# Patient Record
Sex: FEMALE | Race: WHITE | NOT HISPANIC OR LATINO | Employment: OTHER | URBAN - METROPOLITAN AREA
[De-identification: names, ages, dates, MRNs, and addresses within clinical notes are randomized per-mention and may not be internally consistent; named-entity substitution may affect disease eponyms.]

---

## 2023-02-28 LAB
ALANINE AMINOTRANSFERASE (SGPT) (U/L) IN SER/PLAS: 16 U/L (ref 7–45)
ALBUMIN (G/DL) IN SER/PLAS: 4.7 G/DL (ref 3.4–5)
ALKALINE PHOSPHATASE (U/L) IN SER/PLAS: 76 U/L (ref 33–136)
ANION GAP IN SER/PLAS: 13 MMOL/L (ref 10–20)
ASPARTATE AMINOTRANSFERASE (SGOT) (U/L) IN SER/PLAS: 16 U/L (ref 9–39)
BILIRUBIN TOTAL (MG/DL) IN SER/PLAS: 1.1 MG/DL (ref 0–1.2)
CALCIDIOL (25 OH VITAMIN D3) (NG/ML) IN SER/PLAS: 42 NG/ML
CALCIUM (MG/DL) IN SER/PLAS: 9.9 MG/DL (ref 8.6–10.6)
CARBON DIOXIDE, TOTAL (MMOL/L) IN SER/PLAS: 27 MMOL/L (ref 21–32)
CHLORIDE (MMOL/L) IN SER/PLAS: 106 MMOL/L (ref 98–107)
CHOLESTEROL (MG/DL) IN SER/PLAS: 156 MG/DL (ref 0–199)
CHOLESTEROL IN HDL (MG/DL) IN SER/PLAS: 65.7 MG/DL
CHOLESTEROL/HDL RATIO: 2.4
CREATININE (MG/DL) IN SER/PLAS: 0.81 MG/DL (ref 0.5–1.05)
ERYTHROCYTE DISTRIBUTION WIDTH (RATIO) BY AUTOMATED COUNT: 13 % (ref 11.5–14.5)
ERYTHROCYTE MEAN CORPUSCULAR HEMOGLOBIN CONCENTRATION (G/DL) BY AUTOMATED: 32.4 G/DL (ref 32–36)
ERYTHROCYTE MEAN CORPUSCULAR VOLUME (FL) BY AUTOMATED COUNT: 96 FL (ref 80–100)
ERYTHROCYTES (10*6/UL) IN BLOOD BY AUTOMATED COUNT: 4.73 X10E12/L (ref 4–5.2)
ESTIMATED AVERAGE GLUCOSE FOR HBA1C: 103 MG/DL
GFR FEMALE: 75 ML/MIN/1.73M2
GLUCOSE (MG/DL) IN SER/PLAS: 103 MG/DL (ref 74–99)
HEMATOCRIT (%) IN BLOOD BY AUTOMATED COUNT: 45.4 % (ref 36–46)
HEMOGLOBIN (G/DL) IN BLOOD: 14.7 G/DL (ref 12–16)
HEMOGLOBIN A1C/HEMOGLOBIN TOTAL IN BLOOD: 5.2 %
LDL: 72 MG/DL (ref 0–99)
LEUKOCYTES (10*3/UL) IN BLOOD BY AUTOMATED COUNT: 11.7 X10E9/L (ref 4.4–11.3)
NRBC (PER 100 WBCS) BY AUTOMATED COUNT: 0 /100 WBC (ref 0–0)
PLATELETS (10*3/UL) IN BLOOD AUTOMATED COUNT: 170 X10E9/L (ref 150–450)
POTASSIUM (MMOL/L) IN SER/PLAS: 4 MMOL/L (ref 3.5–5.3)
PROTEIN TOTAL: 7 G/DL (ref 6.4–8.2)
SODIUM (MMOL/L) IN SER/PLAS: 142 MMOL/L (ref 136–145)
THYROTROPIN (MIU/L) IN SER/PLAS BY DETECTION LIMIT <= 0.05 MIU/L: 1.77 MIU/L (ref 0.44–3.98)
TRIGLYCERIDE (MG/DL) IN SER/PLAS: 90 MG/DL (ref 0–149)
UREA NITROGEN (MG/DL) IN SER/PLAS: 10 MG/DL (ref 6–23)
VLDL: 18 MG/DL (ref 0–40)

## 2023-03-21 ENCOUNTER — TELEPHONE (OUTPATIENT)
Dept: PRIMARY CARE | Facility: CLINIC | Age: 78
End: 2023-03-21
Payer: MEDICARE

## 2023-03-21 NOTE — TELEPHONE ENCOUNTER
Patient calling to get the information on injection that was given at her appointment which was 2/28 since Medical part D is asking.       Patient needs the name of the shot and needs receipt for this.

## 2024-02-06 DIAGNOSIS — E78.5 HYPERLIPIDEMIA, UNSPECIFIED HYPERLIPIDEMIA TYPE: Primary | ICD-10-CM

## 2024-02-06 RX ORDER — ATORVASTATIN CALCIUM 10 MG/1
10 TABLET, FILM COATED ORAL DAILY
COMMUNITY
End: 2024-02-06 | Stop reason: SDUPTHER

## 2024-02-06 RX ORDER — ATORVASTATIN CALCIUM 10 MG/1
10 TABLET, FILM COATED ORAL DAILY
Qty: 90 TABLET | Refills: 3 | Status: SHIPPED | OUTPATIENT
Start: 2024-02-06 | End: 2024-02-07 | Stop reason: SDUPTHER

## 2024-02-07 ENCOUNTER — TELEPHONE (OUTPATIENT)
Dept: PRIMARY CARE | Facility: CLINIC | Age: 79
End: 2024-02-07
Payer: MEDICARE

## 2024-02-07 DIAGNOSIS — E78.5 HYPERLIPIDEMIA, UNSPECIFIED HYPERLIPIDEMIA TYPE: ICD-10-CM

## 2024-02-07 RX ORDER — ATORVASTATIN CALCIUM 10 MG/1
10 TABLET, FILM COATED ORAL DAILY
Qty: 90 TABLET | Refills: 3 | Status: SHIPPED | OUTPATIENT
Start: 2024-02-07

## 2024-02-07 NOTE — TELEPHONE ENCOUNTER
Per patient please do not use any mail order pharmacy for her medications    Patient only uses   [facility], [address]

## 2024-03-01 ENCOUNTER — OFFICE VISIT (OUTPATIENT)
Dept: PRIMARY CARE | Facility: CLINIC | Age: 79
End: 2024-03-01
Payer: MEDICARE

## 2024-03-01 ENCOUNTER — APPOINTMENT (OUTPATIENT)
Dept: PRIMARY CARE | Facility: CLINIC | Age: 79
End: 2024-03-01
Payer: MEDICARE

## 2024-03-01 VITALS
OXYGEN SATURATION: 98 % | HEART RATE: 66 BPM | WEIGHT: 156.4 LBS | HEIGHT: 63 IN | BODY MASS INDEX: 27.71 KG/M2 | DIASTOLIC BLOOD PRESSURE: 76 MMHG | SYSTOLIC BLOOD PRESSURE: 179 MMHG

## 2024-03-01 DIAGNOSIS — Z00.00 ANNUAL PHYSICAL EXAM: ICD-10-CM

## 2024-03-01 DIAGNOSIS — E55.9 VITAMIN D DEFICIENCY: ICD-10-CM

## 2024-03-01 DIAGNOSIS — E78.5 HYPERLIPIDEMIA, UNSPECIFIED HYPERLIPIDEMIA TYPE: ICD-10-CM

## 2024-03-01 DIAGNOSIS — Z00.00 WELLNESS EXAMINATION: Primary | ICD-10-CM

## 2024-03-01 PROCEDURE — 93000 ELECTROCARDIOGRAM COMPLETE: CPT | Performed by: INTERNAL MEDICINE

## 2024-03-01 PROCEDURE — 90677 PCV20 VACCINE IM: CPT | Performed by: INTERNAL MEDICINE

## 2024-03-01 PROCEDURE — G0439 PPPS, SUBSEQ VISIT: HCPCS | Performed by: INTERNAL MEDICINE

## 2024-03-01 PROCEDURE — 1170F FXNL STATUS ASSESSED: CPT | Performed by: INTERNAL MEDICINE

## 2024-03-01 PROCEDURE — 1159F MED LIST DOCD IN RCRD: CPT | Performed by: INTERNAL MEDICINE

## 2024-03-01 PROCEDURE — 1160F RVW MEDS BY RX/DR IN RCRD: CPT | Performed by: INTERNAL MEDICINE

## 2024-03-01 PROCEDURE — G0009 ADMIN PNEUMOCOCCAL VACCINE: HCPCS | Performed by: INTERNAL MEDICINE

## 2024-03-01 PROCEDURE — 1036F TOBACCO NON-USER: CPT | Performed by: INTERNAL MEDICINE

## 2024-03-01 PROCEDURE — 99397 PER PM REEVAL EST PAT 65+ YR: CPT | Performed by: INTERNAL MEDICINE

## 2024-03-01 RX ORDER — PHENOL 1.4 %
1 AEROSOL, SPRAY (ML) MUCOUS MEMBRANE DAILY
COMMUNITY
Start: 2020-02-11

## 2024-03-01 RX ORDER — ACETAMINOPHEN 500 MG
2000 TABLET ORAL DAILY
COMMUNITY
Start: 2015-12-07

## 2024-03-01 ASSESSMENT — ACTIVITIES OF DAILY LIVING (ADL)
DRESSING: INDEPENDENT
MANAGING_FINANCES: INDEPENDENT
GROCERY_SHOPPING: INDEPENDENT
DRESSING: INDEPENDENT
TAKING_MEDICATION: INDEPENDENT
BATHING: INDEPENDENT
DOING_HOUSEWORK: INDEPENDENT
BATHING: INDEPENDENT

## 2024-03-01 ASSESSMENT — PATIENT HEALTH QUESTIONNAIRE - PHQ9
1. LITTLE INTEREST OR PLEASURE IN DOING THINGS: NOT AT ALL
2. FEELING DOWN, DEPRESSED OR HOPELESS: NOT AT ALL
SUM OF ALL RESPONSES TO PHQ9 QUESTIONS 1 AND 2: 0

## 2024-03-01 NOTE — PROGRESS NOTES
"Subjective   Patient ID: Janie Aranda is a 79 y.o. female who presents for the following    PHYSICAL and MEDICARE WELLNESS   Assessment/Plan   CBC, CMP, lipid panel, a1c, tsh, vitamin d       ekg wnl     last colonoscopy 22 with dr denise with 3-5 year follow up.      mammograms in 2023      follow up in 6 months      dexa scan order   [id number]      patient volunteers in a       HPI  female, htn, HLD, PUD, H. 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 no side effects at this time.   she does a good job with limiting simple carbohydrates and continue to do lifestyle modifications including dieting and exercising     last colonoscopy was 2017 with repeat next year with dr denise     family history: mom leukemia, melena and diabetes and mi and  at 73      patient enjoys exercising a lot walks 5 miles    Visit Vitals  /76 (BP Location: Left arm, Patient Position: Sitting, BP Cuff Size: Adult)   Pulse 66   Ht 1.6 m (5' 3\")   Wt 70.9 kg (156 lb 6.4 oz)   SpO2 98%   BMI 27.71 kg/m²   Smoking Status Never   BSA 1.78 m²     PHYSICAL EXAM   General appearance: Alert and in no acute distress. speech is clear and coherent  HEENT: Sclera and conjunctiva white, EOMI, uvela midline, no mouth lesions. PERRLA,  nasal turbinates are not swollen without exudate. TM's Vaz with cone of light, external ear canal with scant cerumen. No head trauma  Neck: no carotid bruits or thyromegaly. no lymphadenopathy   Respiratory : No respiratory distress, normal respiratory rhythm and effort. Clear bilateral breath sounds. No wheezing or " rhonchi.   Cardiovascular: heart rate regular, S1, S2. no murmurs. no Lower extremity edema  Skin inspection: Normal skin color and pigmentation, normal skin turgor and no visible rash, induration, or cellulitis  MSK: 5/5 strength upper and lower extremities without gait abnormalities. no loss of muscle mass   Neuro: 2-12 CN grossly intact.  no slurred speech. no lateralizing deficit  Psychiatric Orientation: Oriented to person, place, and time. no depression, homicidal or suicidal thoughts, normal affect  Abdomen: soft, none tender, none distended. no organomegaly      REVIEW OF SYSTEMS   Constitutional: not feeling tired and no fever, chills or sweats. Denies weight loss    HEENT: no earache and no sore throat. no blurred vision and or double vision. no headache  Cardiovascular: no exertional chest pain, no palpitations, no lower extremity edema and no intermittent leg claudication.   Lungs: Denies shortness of breath, exertional dyspnea, wheezing  Gastrointestinal: no change in bowel habits, no diarrhea, no nausea, no vomiting and no abdominal pain. Denies Melena, brbpr or dark stool  Musculoskeletal: no myalgias, no muscle weakness and no limb swelling.   Skin: no rashes, no change in skin color and pigmentation and no skin lumps.   Neurological: no headaches, no seizures, no numbness, no lateralizing deficits and no fainting.   Psychiatric: no depression and no anxiety.   Urine: denies polyuria, hematuria, dysuria   Endocrine: no cold intolerance, no heat intolerance      No Known Allergies    Current Outpatient Medications   Medication Sig Dispense Refill    atorvastatin (Lipitor) 10 mg tablet Take 1 tablet (10 mg) by mouth once daily. 90 tablet 3    cholecalciferol (Vitamin D-3) 50 mcg (2,000 unit) capsule Take 1 capsule (50 mcg) by mouth early in the morning..      multivitamin with minerals (Adults Multivitamin) tablet Take 1 tablet by mouth once daily.       No current facility-administered medications for  this visit.       Objective     No visits with results within 4 Month(s) from this visit.   Latest known visit with results is:   Orders Only on 02/28/2023   Component Date Value Ref Range Status    Hemoglobin A1C 02/28/2023 5.2  % Final    Estimated Average Glucose 02/28/2023 103  MG/DL Final       Radiology: Reviewed imaging in powerchart.  No results found.    No family history on file.  Social History     Socioeconomic History    Marital status:      Spouse name: None    Number of children: None    Years of education: None    Highest education level: None   Occupational History    None   Tobacco Use    Smoking status: Never    Smokeless tobacco: Never   Substance and Sexual Activity    Alcohol use: Yes     Alcohol/week: 5.0 standard drinks of alcohol     Types: 5 Glasses of wine per week    Drug use: Never    Sexual activity: None   Other Topics Concern    None   Social History Narrative    None     Social Determinants of Health     Financial Resource Strain: Not on file   Food Insecurity: Not on file   Transportation Needs: Not on file   Physical Activity: Not on file   Stress: Not on file   Social Connections: Not on file   Intimate Partner Violence: Not on file   Housing Stability: Not on file     Past Medical History:   Diagnosis Date    Other specified postprocedural states     H/O colonoscopy    Other specified soft tissue disorders 04/24/2015    Leg swelling    Personal history of other infectious and parasitic diseases     History of cold sores     Past Surgical History:   Procedure Laterality Date    HYSTERECTOMY  11/20/2014    Hysterectomy    KNEE ARTHROSCOPY W/ DEBRIDEMENT  11/20/2014    Arthroscopy Knee       Charting was completed using voice recognition technology and may include unintended errors.

## 2024-03-04 ENCOUNTER — LAB (OUTPATIENT)
Dept: LAB | Facility: LAB | Age: 79
End: 2024-03-04
Payer: MEDICARE

## 2024-03-04 DIAGNOSIS — Z00.00 ANNUAL PHYSICAL EXAM: ICD-10-CM

## 2024-03-04 DIAGNOSIS — E55.9 VITAMIN D DEFICIENCY: ICD-10-CM

## 2024-03-04 DIAGNOSIS — E78.5 HYPERLIPIDEMIA, UNSPECIFIED HYPERLIPIDEMIA TYPE: ICD-10-CM

## 2024-03-04 LAB
25(OH)D3 SERPL-MCNC: 40 NG/ML (ref 30–100)
ALBUMIN SERPL BCP-MCNC: 4.3 G/DL (ref 3.4–5)
ALP SERPL-CCNC: 81 U/L (ref 33–136)
ALT SERPL W P-5'-P-CCNC: 21 U/L (ref 7–45)
ANION GAP SERPL CALC-SCNC: 14 MMOL/L (ref 10–20)
AST SERPL W P-5'-P-CCNC: 19 U/L (ref 9–39)
BILIRUB SERPL-MCNC: 1 MG/DL (ref 0–1.2)
BUN SERPL-MCNC: 13 MG/DL (ref 6–23)
CALCIUM SERPL-MCNC: 9.8 MG/DL (ref 8.6–10.6)
CHLORIDE SERPL-SCNC: 107 MMOL/L (ref 98–107)
CHOLEST SERPL-MCNC: 154 MG/DL (ref 0–199)
CHOLESTEROL/HDL RATIO: 2.5
CO2 SERPL-SCNC: 26 MMOL/L (ref 21–32)
CREAT SERPL-MCNC: 0.75 MG/DL (ref 0.5–1.05)
EGFRCR SERPLBLD CKD-EPI 2021: 82 ML/MIN/1.73M*2
ERYTHROCYTE [DISTWIDTH] IN BLOOD BY AUTOMATED COUNT: 13.1 % (ref 11.5–14.5)
EST. AVERAGE GLUCOSE BLD GHB EST-MCNC: 100 MG/DL
GLUCOSE SERPL-MCNC: 96 MG/DL (ref 74–99)
HBA1C MFR BLD: 5.1 %
HCT VFR BLD AUTO: 43.9 % (ref 36–46)
HDLC SERPL-MCNC: 60.9 MG/DL
HGB BLD-MCNC: 14.5 G/DL (ref 12–16)
LDLC SERPL CALC-MCNC: 80 MG/DL
MCH RBC QN AUTO: 31.7 PG (ref 26–34)
MCHC RBC AUTO-ENTMCNC: 33 G/DL (ref 32–36)
MCV RBC AUTO: 96 FL (ref 80–100)
NON HDL CHOLESTEROL: 93 MG/DL (ref 0–149)
NRBC BLD-RTO: 0 /100 WBCS (ref 0–0)
PLATELET # BLD AUTO: 198 X10*3/UL (ref 150–450)
POTASSIUM SERPL-SCNC: 4.5 MMOL/L (ref 3.5–5.3)
PROT SERPL-MCNC: 7 G/DL (ref 6.4–8.2)
RBC # BLD AUTO: 4.57 X10*6/UL (ref 4–5.2)
SODIUM SERPL-SCNC: 142 MMOL/L (ref 136–145)
TRIGL SERPL-MCNC: 66 MG/DL (ref 0–149)
TSH SERPL-ACNC: 2.34 MIU/L (ref 0.44–3.98)
VLDL: 13 MG/DL (ref 0–40)
WBC # BLD AUTO: 9.6 X10*3/UL (ref 4.4–11.3)

## 2024-03-04 PROCEDURE — 85027 COMPLETE CBC AUTOMATED: CPT

## 2024-03-04 PROCEDURE — 36415 COLL VENOUS BLD VENIPUNCTURE: CPT

## 2024-03-04 PROCEDURE — 80053 COMPREHEN METABOLIC PANEL: CPT

## 2024-03-04 PROCEDURE — 80061 LIPID PANEL: CPT

## 2024-03-04 PROCEDURE — 84443 ASSAY THYROID STIM HORMONE: CPT

## 2024-03-04 PROCEDURE — 82306 VITAMIN D 25 HYDROXY: CPT

## 2024-03-04 PROCEDURE — 83036 HEMOGLOBIN GLYCOSYLATED A1C: CPT

## 2024-04-19 ENCOUNTER — TELEPHONE (OUTPATIENT)
Dept: PRIMARY CARE | Facility: CLINIC | Age: 79
End: 2024-04-19
Payer: MEDICARE

## 2024-04-19 DIAGNOSIS — I10 HYPERTENSION, UNSPECIFIED TYPE: Primary | ICD-10-CM

## 2024-04-19 RX ORDER — AMLODIPINE BESYLATE 5 MG/1
5 TABLET ORAL DAILY
Qty: 90 TABLET | Refills: 3 | Status: SHIPPED | OUTPATIENT
Start: 2024-04-19 | End: 2025-04-14

## 2024-04-19 NOTE — TELEPHONE ENCOUNTER
Patient called in stating her blood pressure has been in the 150/80's since her appointment in March. Today it was 157/86 and last night it was 177/81. She thinks she should be started on medications, I scheduled her for Monday to discuss. Please advise if any recommendations in the meantime.

## 2024-04-22 ENCOUNTER — OFFICE VISIT (OUTPATIENT)
Dept: PRIMARY CARE | Facility: CLINIC | Age: 79
End: 2024-04-22
Payer: MEDICARE

## 2024-04-22 VITALS
HEIGHT: 63 IN | BODY MASS INDEX: 27.82 KG/M2 | OXYGEN SATURATION: 97 % | SYSTOLIC BLOOD PRESSURE: 120 MMHG | HEART RATE: 73 BPM | DIASTOLIC BLOOD PRESSURE: 64 MMHG | WEIGHT: 157 LBS

## 2024-04-22 DIAGNOSIS — I10 HYPERTENSION, UNSPECIFIED TYPE: Primary | ICD-10-CM

## 2024-04-22 PROCEDURE — 1036F TOBACCO NON-USER: CPT | Performed by: INTERNAL MEDICINE

## 2024-04-22 PROCEDURE — 99212 OFFICE O/P EST SF 10 MIN: CPT | Performed by: INTERNAL MEDICINE

## 2024-04-22 PROCEDURE — 1159F MED LIST DOCD IN RCRD: CPT | Performed by: INTERNAL MEDICINE

## 2024-04-22 PROCEDURE — 3078F DIAST BP <80 MM HG: CPT | Performed by: INTERNAL MEDICINE

## 2024-04-22 PROCEDURE — 3074F SYST BP LT 130 MM HG: CPT | Performed by: INTERNAL MEDICINE

## 2024-04-22 PROCEDURE — 1160F RVW MEDS BY RX/DR IN RCRD: CPT | Performed by: INTERNAL MEDICINE

## 2024-04-22 NOTE — PROGRESS NOTES
"Subjective   Patient ID: Janie Aranda is a 79 y.o. female who presents for the following  Follow up HTN     PHYSICAL and MEDICARE WELLNESS   Assessment/Plan   Htn: STABLE ON amlodipine 5mg daily       CBC, CMP, lipid panel, a1c, tsh, vitamin d       ekg wnl     last colonoscopy 22 with dr denise with 3-5 year follow up.      mammograms in 2023      follow up in 6 months      dexa scan order   [id number]      patient volunteers in a       HPI  female, htn, HLD, PUD, H. 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 patient denies any stroke like symptoms    Other medical issues, see below   HLD: Patient denies any muscle aches and is tolerating statin therapy  started on atorvastatin LDL improved from 150s to 70s. no side effects at this time.   she does a good job with limiting simple carbohydrates and continue to do lifestyle modifications including dieting and exercising     last colonoscopy was 2017 with repeat next year with dr denise     family history: mom leukemia, melena and diabetes and mi and  at 73      patient enjoys exercising a lot walks 5 miles    Visit Vitals  /64 (BP Location: Left arm, Patient Position: Sitting, BP Cuff Size: Adult)   Pulse 73   Ht 1.6 m (5' 3\")   Wt 71.2 kg (157 lb)   SpO2 97%   BMI 27.81 kg/m²   Smoking Status Never   BSA 1.78 m²     PHYSICAL EXAM   General appearance: Alert and in no acute distress. speech is clear and coherent  HEENT: Sclera and conjunctiva white, EOMI, uvela midline, no mouth lesions.     Respiratory : No respiratory distress, normal respiratory rhythm and effort. Clear bilateral breath sounds. No wheezing or rhonchi. "   Cardiovascular: heart rate regular, S1, S2. no murmurs. no Lower extremity edema  Skin inspection: Normal skin color and pigmentation, normal skin turgor and no visible rash, induration, or cellulitis  MSK: 5/5 strength upper and lower extremities without gait abnormalities. no loss of muscle mass   Neuro: 2-12 CN grossly intact.  no slurred speech. no lateralizing deficit  Psychiatric Orientation: Oriented to person, place, and time. no depression, homicidal or suicidal thoughts, normal affect       REVIEW OF SYSTEMS   Constitutional: not feeling tired and no fever, chills or sweats. Denies weight loss    HEENT: no earache and no sore throat. no blurred vision and or double vision. no headache  Cardiovascular: no exertional chest pain, no palpitations,    Lungs: Denies shortness of breath, exertional dyspnea, wheezing  Gastrointestinal: no change in bowel habits, no diarrhea, no nausea,    Musculoskeletal: no myalgias, no muscle weakness and no limb swelling.   Skin: no rashes, no change in skin color and pigmentation and no skin lumps.   Neurological: no headaches, no seizures, no numbness, no lateralizing deficits and no fainting.   Psychiatric: no depression and no anxiety.   Urine: denies polyuria, hematuria, dysuria        No Known Allergies    Current Outpatient Medications   Medication Sig Dispense Refill    amLODIPine (Norvasc) 5 mg tablet Take 1 tablet (5 mg) by mouth once daily. 90 tablet 3    atorvastatin (Lipitor) 10 mg tablet Take 1 tablet (10 mg) by mouth once daily. 90 tablet 3    cholecalciferol (Vitamin D-3) 50 mcg (2,000 unit) capsule Take 1 capsule (50 mcg) by mouth early in the morning..      multivitamin with minerals (Adults Multivitamin) tablet Take 1 tablet by mouth once daily.       No current facility-administered medications for this visit.       Objective     Lab on 03/04/2024   Component Date Value Ref Range Status    WBC 03/04/2024 9.6  4.4 - 11.3 x10*3/uL Final    nRBC 03/04/2024  0.0  0.0 - 0.0 /100 WBCs Final    RBC 03/04/2024 4.57  4.00 - 5.20 x10*6/uL Final    Hemoglobin 03/04/2024 14.5  12.0 - 16.0 g/dL Final    Hematocrit 03/04/2024 43.9  36.0 - 46.0 % Final    MCV 03/04/2024 96  80 - 100 fL Final    MCH 03/04/2024 31.7  26.0 - 34.0 pg Final    MCHC 03/04/2024 33.0  32.0 - 36.0 g/dL Final    RDW 03/04/2024 13.1  11.5 - 14.5 % Final    Platelets 03/04/2024 198  150 - 450 x10*3/uL Final    Glucose 03/04/2024 96  74 - 99 mg/dL Final    Sodium 03/04/2024 142  136 - 145 mmol/L Final    Potassium 03/04/2024 4.5  3.5 - 5.3 mmol/L Final    Chloride 03/04/2024 107  98 - 107 mmol/L Final    Bicarbonate 03/04/2024 26  21 - 32 mmol/L Final    Anion Gap 03/04/2024 14  10 - 20 mmol/L Final    Urea Nitrogen 03/04/2024 13  6 - 23 mg/dL Final    Creatinine 03/04/2024 0.75  0.50 - 1.05 mg/dL Final    eGFR 03/04/2024 82  >60 mL/min/1.73m*2 Final    Calcium 03/04/2024 9.8  8.6 - 10.6 mg/dL Final    Albumin 03/04/2024 4.3  3.4 - 5.0 g/dL Final    Alkaline Phosphatase 03/04/2024 81  33 - 136 U/L Final    Total Protein 03/04/2024 7.0  6.4 - 8.2 g/dL Final    AST 03/04/2024 19  9 - 39 U/L Final    Bilirubin, Total 03/04/2024 1.0  0.0 - 1.2 mg/dL Final    ALT 03/04/2024 21  7 - 45 U/L Final    Hemoglobin A1C 03/04/2024 5.1  see below % Final    Estimated Average Glucose 03/04/2024 100  Not Established mg/dL Final    Cholesterol 03/04/2024 154  0 - 199 mg/dL Final    HDL-Cholesterol 03/04/2024 60.9  mg/dL Final    Cholesterol/HDL Ratio 03/04/2024 2.5   Final    LDL Calculated 03/04/2024 80  <=99 mg/dL Final    VLDL 03/04/2024 13  0 - 40 mg/dL Final    Triglycerides 03/04/2024 66  0 - 149 mg/dL Final    Non HDL Cholesterol 03/04/2024 93  0 - 149 mg/dL Final    Thyroid Stimulating Hormone 03/04/2024 2.34  0.44 - 3.98 mIU/L Final    Vitamin D, 25-Hydroxy, Total 03/04/2024 40  30 - 100 ng/mL Final       Radiology: Reviewed imaging in powerchart.  No results found.    No family history on file.  Social History      Socioeconomic History    Marital status:      Spouse name: None    Number of children: None    Years of education: None    Highest education level: None   Occupational History    None   Tobacco Use    Smoking status: Never    Smokeless tobacco: Never   Substance and Sexual Activity    Alcohol use: Yes     Alcohol/week: 5.0 standard drinks of alcohol     Types: 5 Glasses of wine per week    Drug use: Never    Sexual activity: None   Other Topics Concern    None   Social History Narrative    None     Social Determinants of Health     Financial Resource Strain: Not on file   Food Insecurity: Not on file   Transportation Needs: Not on file   Physical Activity: Not on file   Stress: Not on file   Social Connections: Not on file   Intimate Partner Violence: Not on file   Housing Stability: Not on file     Past Medical History:   Diagnosis Date    Other specified postprocedural states     H/O colonoscopy    Other specified soft tissue disorders 04/24/2015    Leg swelling    Personal history of other infectious and parasitic diseases     History of cold sores     Past Surgical History:   Procedure Laterality Date    HYSTERECTOMY  11/20/2014    Hysterectomy    KNEE ARTHROSCOPY W/ DEBRIDEMENT  11/20/2014    Arthroscopy Knee       Charting was completed using voice recognition technology and may include unintended errors.

## 2024-06-26 ENCOUNTER — OFFICE VISIT (OUTPATIENT)
Dept: URGENT CARE | Facility: CLINIC | Age: 79
End: 2024-06-26
Payer: MEDICARE

## 2024-06-26 ENCOUNTER — HOSPITAL ENCOUNTER (OUTPATIENT)
Dept: RADIOLOGY | Facility: CLINIC | Age: 79
Discharge: HOME | End: 2024-06-26
Payer: MEDICARE

## 2024-06-26 VITALS
BODY MASS INDEX: 26.57 KG/M2 | OXYGEN SATURATION: 98 % | RESPIRATION RATE: 16 BRPM | HEART RATE: 63 BPM | SYSTOLIC BLOOD PRESSURE: 151 MMHG | DIASTOLIC BLOOD PRESSURE: 70 MMHG | TEMPERATURE: 97.1 F | WEIGHT: 150 LBS

## 2024-06-26 DIAGNOSIS — S62.644A CLOSED NONDISPLACED FRACTURE OF PROXIMAL PHALANX OF RIGHT RING FINGER, INITIAL ENCOUNTER: ICD-10-CM

## 2024-06-26 DIAGNOSIS — S69.91XA INJURY OF RIGHT HAND, INITIAL ENCOUNTER: ICD-10-CM

## 2024-06-26 DIAGNOSIS — S69.91XA INJURY OF RIGHT HAND, INITIAL ENCOUNTER: Primary | ICD-10-CM
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
## 2024-06-26 DIAGNOSIS — T07.XXXA ABRASIONS OF MULTIPLE SITES: ICD-10-CM

## 2024-06-26 PROCEDURE — 1159F MED LIST DOCD IN RCRD: CPT | Performed by: PHYSICIAN ASSISTANT

## 2024-06-26 PROCEDURE — 1125F AMNT PAIN NOTED PAIN PRSNT: CPT | Performed by: PHYSICIAN ASSISTANT

## 2024-06-26 PROCEDURE — 1036F TOBACCO NON-USER: CPT | Performed by: PHYSICIAN ASSISTANT

## 2024-06-26 PROCEDURE — 73130 X-RAY EXAM OF HAND: CPT | Mod: RT

## 2024-06-26 PROCEDURE — 3077F SYST BP >= 140 MM HG: CPT | Performed by: PHYSICIAN ASSISTANT

## 2024-06-26 PROCEDURE — 99204 OFFICE O/P NEW MOD 45 MIN: CPT | Performed by: PHYSICIAN ASSISTANT

## 2024-06-26 PROCEDURE — 3078F DIAST BP <80 MM HG: CPT | Performed by: PHYSICIAN ASSISTANT

## 2024-06-26 ASSESSMENT — ENCOUNTER SYMPTOMS
HEMATOLOGIC/LYMPHATIC NEGATIVE: 1
CONSTITUTIONAL NEGATIVE: 1
WOUND: 1
ALLERGIC/IMMUNOLOGIC NEGATIVE: 1
PSYCHIATRIC NEGATIVE: 1
NEUROLOGICAL NEGATIVE: 1
ARTHRALGIAS: 1
ENDOCRINE NEGATIVE: 1
EYES NEGATIVE: 1
GASTROINTESTINAL NEGATIVE: 1
CARDIOVASCULAR NEGATIVE: 1
RESPIRATORY NEGATIVE: 1

## 2024-06-26 ASSESSMENT — PAIN SCALES - GENERAL: PAINLEVEL: 7

## 2024-06-26 NOTE — PATIENT INSTRUCTIONS
Splint till seen by orthopedics this week  Tylenol as directed for pain  Ice and elevate hand 2-3 times a day  Orthopedics this week  ER visit anytime 24/7 for acute worsening or changing condition

## 2024-06-26 NOTE — PROGRESS NOTES
Subjective   Patient ID: Janie Aranda is a 79 y.o. female.      History provided by:  Patient   used: No    Hand Injury    This is a 79 yr old female here for right hand injury. Pt tripped over a curb yesterday injuring the right hand. Pain mainly in lateral fingers. No wrist pain. Mild other aches and pains and abrasion.     Review of Systems   Constitutional: Negative.    HENT: Negative.     Eyes: Negative.    Respiratory: Negative.     Cardiovascular: Negative.    Gastrointestinal: Negative.    Endocrine: Negative.    Genitourinary: Negative.    Musculoskeletal:  Positive for arthralgias.   Skin:  Positive for wound.   Allergic/Immunologic: Negative.    Neurological: Negative.    Hematological: Negative.    Psychiatric/Behavioral: Negative.     All other systems reviewed and are negative.  /70   Pulse 63   Temp 36.2 °C (97.1 °F)   Resp 16   Wt 68 kg (150 lb)   SpO2 98%   BMI 26.57 kg/m²     Objective   Physical Exam  Vitals and nursing note reviewed.   Constitutional:       Appearance: Normal appearance.   HENT:      Head: Normocephalic and atraumatic.   Cardiovascular:      Rate and Rhythm: Normal rate and regular rhythm.   Pulmonary:      Effort: Pulmonary effort is normal.      Breath sounds: Normal breath sounds.   Musculoskeletal:      Comments: Right hand-pain with palpation, limited FROM secondary to pain and swelling, ecchymosis present, distal n-v intact. No right wrist pain with palpation   Skin:     General: Skin is warm and dry.      Comments: Few scattered abrasions   Neurological:      General: No focal deficit present.      Mental Status: She is alert and oriented to person, place, and time.   Psychiatric:         Mood and Affect: Mood normal.         Behavior: Behavior normal.     Assessment:  Right 4th finger fx  Fall  Abrasions    Plan:  Keep abrasions clean and dry  Right hand xray positive for 4th finger fx.  Gutter splint applied to right UE  Tylenol as  directed for pain  Ice and elevate hand 2-3 times a day for pain or swelling  Orthopedic follow up this week   ER visit anytime 24/7 for acute worsening or changing condition

## 2024-08-23 ENCOUNTER — OFFICE VISIT (OUTPATIENT)
Dept: PRIMARY CARE | Facility: CLINIC | Age: 79
End: 2024-08-23
Payer: MEDICARE

## 2024-08-23 VITALS
SYSTOLIC BLOOD PRESSURE: 142 MMHG | HEART RATE: 71 BPM | OXYGEN SATURATION: 98 % | BODY MASS INDEX: 27.46 KG/M2 | DIASTOLIC BLOOD PRESSURE: 82 MMHG | WEIGHT: 155 LBS | HEIGHT: 63 IN

## 2024-08-23 DIAGNOSIS — I10 HYPERTENSION, UNSPECIFIED TYPE: Primary | ICD-10-CM

## 2024-08-23 PROCEDURE — 1036F TOBACCO NON-USER: CPT | Performed by: INTERNAL MEDICINE

## 2024-08-23 PROCEDURE — 1159F MED LIST DOCD IN RCRD: CPT | Performed by: INTERNAL MEDICINE

## 2024-08-23 PROCEDURE — 3077F SYST BP >= 140 MM HG: CPT | Performed by: INTERNAL MEDICINE

## 2024-08-23 PROCEDURE — 99213 OFFICE O/P EST LOW 20 MIN: CPT | Performed by: INTERNAL MEDICINE

## 2024-08-23 PROCEDURE — 3079F DIAST BP 80-89 MM HG: CPT | Performed by: INTERNAL MEDICINE

## 2024-08-23 RX ORDER — HYDROCHLOROTHIAZIDE 25 MG/1
25 TABLET ORAL DAILY
Qty: 90 TABLET | Refills: 3 | Status: SHIPPED | OUTPATIENT
Start: 2024-08-23 | End: 2025-08-18

## 2024-08-23 NOTE — PROGRESS NOTES
"Subjective   Patient ID: Janie Aranda is a 79 y.o. 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      mammograms in 2023      follow up in 6 months      dexa scan order   [id number]      patient volunteers in a       HPI  female, htn, HLD, PUD, H. 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 patient denies any stroke like symptoms    Other medical issues, see below   HLD: Patient denies any muscle aches and is tolerating statin therapy  started on atorvastatin LDL improved from 150s to 70s. no side effects at this time.   she does a good job with limiting simple carbohydrates and continue to do lifestyle modifications including dieting and exercising     last colonoscopy was 2017 with repeat next year with dr denise     family history: mom leukemia, melena and diabetes and mi and  at 73      patient enjoys exercising a lot walks 5 miles    Visit Vitals  /82 (BP Location: Left arm, Patient Position: Sitting, BP Cuff Size: Adult)   Pulse 71   Ht 1.6 m (5' 3\")   Wt 70.3 kg (155 lb)   SpO2 98%   BMI 27.46 kg/m²   Smoking Status Never   BSA 1.77 m²     PHYSICAL EXAM   General appearance: Alert and in no acute distress. speech is clear and coherent  HEENT: Sclera and conjunctiva white, EOMI, uvela midline, no mouth lesions.     Respiratory : No respiratory " distress, normal respiratory rhythm and effort. Clear bilateral breath sounds. No wheezing or rhonchi.   Cardiovascular: heart rate regular, S1, S2. no murmurs. no Lower extremity edema  Skin inspection: Normal skin color and pigmentation, normal skin turgor and no visible rash, induration, or cellulitis  MSK: 5/5 strength upper and lower extremities without gait abnormalities. no loss of muscle mass   Neuro: 2-12 CN grossly intact.  no slurred speech. no lateralizing deficit  Psychiatric Orientation: Oriented to person, place, and time. no depression, homicidal or suicidal thoughts, normal affect       REVIEW OF SYSTEMS   Constitutional: not feeling tired and no fever, chills or sweats. Denies weight loss    HEENT: no earache and no sore throat. no blurred vision and or double vision. no headache  Cardiovascular: no exertional chest pain, no palpitations,    Lungs: Denies shortness of breath, exertional dyspnea, wheezing  Gastrointestinal: no change in bowel habits, no diarrhea, no nausea,    Musculoskeletal: no myalgias, no muscle weakness and no limb swelling.   Skin: no rashes, no change in skin color and pigmentation and no skin lumps.   Neurological: no headaches, no seizures, no numbness, no lateralizing deficits and no fainting.   Psychiatric: no depression and no anxiety.   Urine: denies polyuria, hematuria, dysuria        No Known Allergies    Current Outpatient Medications   Medication Sig Dispense Refill    amLODIPine (Norvasc) 5 mg tablet Take 1 tablet (5 mg) by mouth once daily. 90 tablet 3    atorvastatin (Lipitor) 10 mg tablet Take 1 tablet (10 mg) by mouth once daily. 90 tablet 3    cholecalciferol (Vitamin D-3) 50 mcg (2,000 unit) capsule Take 1 capsule (50 mcg) by mouth early in the morning..      multivitamin with minerals (Adults Multivitamin) tablet Take 1 tablet by mouth once daily.       No current facility-administered medications for this visit.       Objective     No visits with results  within 4 Month(s) from this visit.   Latest known visit with results is:   Lab on 03/04/2024   Component Date Value Ref Range Status    WBC 03/04/2024 9.6  4.4 - 11.3 x10*3/uL Final    nRBC 03/04/2024 0.0  0.0 - 0.0 /100 WBCs Final    RBC 03/04/2024 4.57  4.00 - 5.20 x10*6/uL Final    Hemoglobin 03/04/2024 14.5  12.0 - 16.0 g/dL Final    Hematocrit 03/04/2024 43.9  36.0 - 46.0 % Final    MCV 03/04/2024 96  80 - 100 fL Final    MCH 03/04/2024 31.7  26.0 - 34.0 pg Final    MCHC 03/04/2024 33.0  32.0 - 36.0 g/dL Final    RDW 03/04/2024 13.1  11.5 - 14.5 % Final    Platelets 03/04/2024 198  150 - 450 x10*3/uL Final    Glucose 03/04/2024 96  74 - 99 mg/dL Final    Sodium 03/04/2024 142  136 - 145 mmol/L Final    Potassium 03/04/2024 4.5  3.5 - 5.3 mmol/L Final    Chloride 03/04/2024 107  98 - 107 mmol/L Final    Bicarbonate 03/04/2024 26  21 - 32 mmol/L Final    Anion Gap 03/04/2024 14  10 - 20 mmol/L Final    Urea Nitrogen 03/04/2024 13  6 - 23 mg/dL Final    Creatinine 03/04/2024 0.75  0.50 - 1.05 mg/dL Final    eGFR 03/04/2024 82  >60 mL/min/1.73m*2 Final    Calcium 03/04/2024 9.8  8.6 - 10.6 mg/dL Final    Albumin 03/04/2024 4.3  3.4 - 5.0 g/dL Final    Alkaline Phosphatase 03/04/2024 81  33 - 136 U/L Final    Total Protein 03/04/2024 7.0  6.4 - 8.2 g/dL Final    AST 03/04/2024 19  9 - 39 U/L Final    Bilirubin, Total 03/04/2024 1.0  0.0 - 1.2 mg/dL Final    ALT 03/04/2024 21  7 - 45 U/L Final    Hemoglobin A1C 03/04/2024 5.1  see below % Final    Estimated Average Glucose 03/04/2024 100  Not Established mg/dL Final    Cholesterol 03/04/2024 154  0 - 199 mg/dL Final    HDL-Cholesterol 03/04/2024 60.9  mg/dL Final    Cholesterol/HDL Ratio 03/04/2024 2.5   Final    LDL Calculated 03/04/2024 80  <=99 mg/dL Final    VLDL 03/04/2024 13  0 - 40 mg/dL Final    Triglycerides 03/04/2024 66  0 - 149 mg/dL Final    Non HDL Cholesterol 03/04/2024 93  0 - 149 mg/dL Final    Thyroid Stimulating Hormone 03/04/2024 2.34  0.44 - 3.98  mIU/L Final    Vitamin D, 25-Hydroxy, Total 03/04/2024 40  30 - 100 ng/mL Final       Radiology: Reviewed imaging in powerchart.  No results found.    No family history on file.  Social History     Socioeconomic History    Marital status:    Tobacco Use    Smoking status: Never    Smokeless tobacco: Never   Substance and Sexual Activity    Alcohol use: Yes     Alcohol/week: 5.0 standard drinks of alcohol     Types: 5 Glasses of wine per week    Drug use: Never     Past Medical History:   Diagnosis Date    Other specified postprocedural states     H/O colonoscopy    Other specified soft tissue disorders 04/24/2015    Leg swelling    Personal history of other infectious and parasitic diseases     History of cold sores     Past Surgical History:   Procedure Laterality Date    HYSTERECTOMY  11/20/2014    Hysterectomy    KNEE ARTHROSCOPY W/ DEBRIDEMENT  11/20/2014    Arthroscopy Knee       Charting was completed using voice recognition technology and may include unintended errors.

## 2024-08-26 DIAGNOSIS — I10 HYPERTENSION, UNSPECIFIED TYPE: ICD-10-CM

## 2024-08-26 RX ORDER — AMLODIPINE BESYLATE 5 MG/1
5 TABLET ORAL DAILY
Qty: 90 TABLET | Refills: 3 | Status: SHIPPED | OUTPATIENT
Start: 2024-08-26 | End: 2025-08-21

## 2024-11-20 ENCOUNTER — APPOINTMENT (OUTPATIENT)
Dept: PRIMARY CARE | Facility: CLINIC | Age: 79
End: 2024-11-20
Payer: MEDICARE

## 2025-01-17 ENCOUNTER — TELEPHONE (OUTPATIENT)
Dept: PRIMARY CARE | Facility: CLINIC | Age: 80
End: 2025-01-17
Payer: MEDICARE

## 2025-01-17 DIAGNOSIS — E78.5 HYPERLIPIDEMIA, UNSPECIFIED HYPERLIPIDEMIA TYPE: ICD-10-CM

## 2025-01-17 RX ORDER — ATORVASTATIN CALCIUM 10 MG/1
10 TABLET, FILM COATED ORAL DAILY
Qty: 90 TABLET | Refills: 3 | Status: SHIPPED | OUTPATIENT
Start: 2025-01-17

## 2025-03-04 ENCOUNTER — APPOINTMENT (OUTPATIENT)
Dept: PRIMARY CARE | Facility: CLINIC | Age: 80
End: 2025-03-04
Payer: MEDICARE

## 2025-03-04 VITALS
OXYGEN SATURATION: 97 % | HEART RATE: 55 BPM | BODY MASS INDEX: 27.46 KG/M2 | WEIGHT: 155 LBS | HEIGHT: 63 IN | DIASTOLIC BLOOD PRESSURE: 72 MMHG | SYSTOLIC BLOOD PRESSURE: 122 MMHG

## 2025-03-04 DIAGNOSIS — I10 HYPERTENSION, UNSPECIFIED TYPE: ICD-10-CM

## 2025-03-04 DIAGNOSIS — I10 PRIMARY HYPERTENSION: ICD-10-CM

## 2025-03-04 DIAGNOSIS — Z12.31 ENCOUNTER FOR SCREENING MAMMOGRAM FOR BREAST CANCER: ICD-10-CM

## 2025-03-04 DIAGNOSIS — E78.5 HYPERLIPIDEMIA, UNSPECIFIED HYPERLIPIDEMIA TYPE: ICD-10-CM

## 2025-03-04 DIAGNOSIS — Z00.00 WELLNESS EXAMINATION: Primary | ICD-10-CM

## 2025-03-04 DIAGNOSIS — E55.9 VITAMIN D DEFICIENCY: ICD-10-CM

## 2025-03-04 DIAGNOSIS — Z00.00 ANNUAL PHYSICAL EXAM: ICD-10-CM

## 2025-03-04 DIAGNOSIS — E78.2 MIXED HYPERLIPIDEMIA: ICD-10-CM

## 2025-03-04 PROCEDURE — 1036F TOBACCO NON-USER: CPT | Performed by: INTERNAL MEDICINE

## 2025-03-04 PROCEDURE — 93000 ELECTROCARDIOGRAM COMPLETE: CPT | Performed by: INTERNAL MEDICINE

## 2025-03-04 PROCEDURE — 3074F SYST BP LT 130 MM HG: CPT | Performed by: INTERNAL MEDICINE

## 2025-03-04 PROCEDURE — G0439 PPPS, SUBSEQ VISIT: HCPCS | Performed by: INTERNAL MEDICINE

## 2025-03-04 PROCEDURE — 1160F RVW MEDS BY RX/DR IN RCRD: CPT | Performed by: INTERNAL MEDICINE

## 2025-03-04 PROCEDURE — 1170F FXNL STATUS ASSESSED: CPT | Performed by: INTERNAL MEDICINE

## 2025-03-04 PROCEDURE — 3078F DIAST BP <80 MM HG: CPT | Performed by: INTERNAL MEDICINE

## 2025-03-04 PROCEDURE — 1123F ACP DISCUSS/DSCN MKR DOCD: CPT | Performed by: INTERNAL MEDICINE

## 2025-03-04 PROCEDURE — 1159F MED LIST DOCD IN RCRD: CPT | Performed by: INTERNAL MEDICINE

## 2025-03-04 PROCEDURE — 99397 PER PM REEVAL EST PAT 65+ YR: CPT | Performed by: INTERNAL MEDICINE

## 2025-03-04 RX ORDER — ATORVASTATIN CALCIUM 10 MG/1
10 TABLET, FILM COATED ORAL DAILY
Qty: 90 TABLET | Refills: 3 | Status: SHIPPED | OUTPATIENT
Start: 2025-03-04

## 2025-03-04 RX ORDER — AMLODIPINE BESYLATE 5 MG/1
5 TABLET ORAL DAILY
Qty: 90 TABLET | Refills: 3 | Status: SHIPPED | OUTPATIENT
Start: 2025-03-04 | End: 2026-02-27

## 2025-03-04 ASSESSMENT — ACTIVITIES OF DAILY LIVING (ADL)
BATHING: INDEPENDENT
TAKING_MEDICATION: INDEPENDENT
MANAGING_FINANCES: INDEPENDENT
DOING_HOUSEWORK: INDEPENDENT
GROCERY_SHOPPING: INDEPENDENT
DRESSING: INDEPENDENT

## 2025-03-04 ASSESSMENT — PATIENT HEALTH QUESTIONNAIRE - PHQ9
SUM OF ALL RESPONSES TO PHQ9 QUESTIONS 1 AND 2: 0
2. FEELING DOWN, DEPRESSED OR HOPELESS: NOT AT ALL
1. LITTLE INTEREST OR PLEASURE IN DOING THINGS: NOT AT ALL

## 2025-03-04 NOTE — PROGRESS NOTES
Subjective   Patient ID: Janie Aranda is a 80 y.o. 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      mammograms in 2025     follow up in 6 months            patient volunteers in a       HPI  female, htn, HLD, PUD, H. 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 patient denies any stroke like symptoms     HLD: Patient denies any muscle aches and is tolerating statin therapy  started on atorvastatin LDL improved from 150s to 70s. no side effects at this time.   she does a good job with limiting simple carbohydrates and continue to do lifestyle modifications including dieting and exercising     last colonoscopy was 2017 with repeat next year with dr denise     family history: mom leukemia, melena and diabetes and mi and  at 73      patient enjoys exercising a lot walks 5 miles, swimming    Visit Vitals  Smoking Status Never     PHYSICAL EXAM   General appearance: Alert and in no acute distress. speech is clear and coherent  HEENT: Sclera and conjunctiva white, EOMI, uvela midline, no mouth lesions.     Respiratory : No respiratory distress, normal respiratory rhythm and effort. Clear bilateral breath sounds. No wheezing or rhonchi.   Cardiovascular: heart rate regular, S1, S2. no murmurs. no Lower extremity edema  Skin inspection: Normal skin color and  pigmentation, normal skin turgor and no visible rash, induration, or cellulitis  MSK: 5/5 strength upper and lower extremities without gait abnormalities. no loss of muscle mass   Neuro: 2-12 CN grossly intact.  no slurred speech. no lateralizing deficit  Psychiatric Orientation: Oriented to person, place, and time. no depression, homicidal or suicidal thoughts, normal affect       REVIEW OF SYSTEMS   Constitutional: not feeling tired and no fever, chills or sweats. Denies weight loss    HEENT: no earache and no sore throat. no blurred vision and or double vision. no headache  Cardiovascular: no exertional chest pain, no palpitations,    Lungs: Denies shortness of breath, exertional dyspnea, wheezing  Gastrointestinal: no change in bowel habits, no diarrhea, no nausea,    Musculoskeletal: no myalgias, no muscle weakness and no limb swelling.   Skin: no rashes, no change in skin color and pigmentation and no skin lumps.   Neurological: no headaches, no seizures, no numbness, no lateralizing deficits and no fainting.   Psychiatric: no depression and no anxiety.   Urine: denies polyuria, hematuria, dysuria        No Known Allergies    Current Outpatient Medications   Medication Sig Dispense Refill    amLODIPine (Norvasc) 5 mg tablet Take 1 tablet (5 mg) by mouth once daily. 90 tablet 3    atorvastatin (Lipitor) 10 mg tablet Take 1 tablet (10 mg) by mouth once daily. 90 tablet 3    cholecalciferol (Vitamin D-3) 50 mcg (2,000 unit) capsule Take 1 capsule (50 mcg) by mouth early in the morning..      multivitamin with minerals (Adults Multivitamin) tablet Take 1 tablet by mouth once daily.       No current facility-administered medications for this visit.       Objective     No visits with results within 4 Month(s) from this visit.   Latest known visit with results is:   Lab on 03/04/2024   Component Date Value Ref Range Status    WBC 03/04/2024 9.6  4.4 - 11.3 x10*3/uL Final    nRBC 03/04/2024 0.0  0.0 - 0.0 /100 WBCs  Final    RBC 03/04/2024 4.57  4.00 - 5.20 x10*6/uL Final    Hemoglobin 03/04/2024 14.5  12.0 - 16.0 g/dL Final    Hematocrit 03/04/2024 43.9  36.0 - 46.0 % Final    MCV 03/04/2024 96  80 - 100 fL Final    MCH 03/04/2024 31.7  26.0 - 34.0 pg Final    MCHC 03/04/2024 33.0  32.0 - 36.0 g/dL Final    RDW 03/04/2024 13.1  11.5 - 14.5 % Final    Platelets 03/04/2024 198  150 - 450 x10*3/uL Final    Glucose 03/04/2024 96  74 - 99 mg/dL Final    Sodium 03/04/2024 142  136 - 145 mmol/L Final    Potassium 03/04/2024 4.5  3.5 - 5.3 mmol/L Final    Chloride 03/04/2024 107  98 - 107 mmol/L Final    Bicarbonate 03/04/2024 26  21 - 32 mmol/L Final    Anion Gap 03/04/2024 14  10 - 20 mmol/L Final    Urea Nitrogen 03/04/2024 13  6 - 23 mg/dL Final    Creatinine 03/04/2024 0.75  0.50 - 1.05 mg/dL Final    eGFR 03/04/2024 82  >60 mL/min/1.73m*2 Final    Calcium 03/04/2024 9.8  8.6 - 10.6 mg/dL Final    Albumin 03/04/2024 4.3  3.4 - 5.0 g/dL Final    Alkaline Phosphatase 03/04/2024 81  33 - 136 U/L Final    Total Protein 03/04/2024 7.0  6.4 - 8.2 g/dL Final    AST 03/04/2024 19  9 - 39 U/L Final    Bilirubin, Total 03/04/2024 1.0  0.0 - 1.2 mg/dL Final    ALT 03/04/2024 21  7 - 45 U/L Final    Hemoglobin A1C 03/04/2024 5.1  see below % Final    Estimated Average Glucose 03/04/2024 100  Not Established mg/dL Final    Cholesterol 03/04/2024 154  0 - 199 mg/dL Final    HDL-Cholesterol 03/04/2024 60.9  mg/dL Final    Cholesterol/HDL Ratio 03/04/2024 2.5   Final    LDL Calculated 03/04/2024 80  <=99 mg/dL Final    VLDL 03/04/2024 13  0 - 40 mg/dL Final    Triglycerides 03/04/2024 66  0 - 149 mg/dL Final    Non HDL Cholesterol 03/04/2024 93  0 - 149 mg/dL Final    Thyroid Stimulating Hormone 03/04/2024 2.34  0.44 - 3.98 mIU/L Final    Vitamin D, 25-Hydroxy, Total 03/04/2024 40  30 - 100 ng/mL Final       Radiology: Reviewed imaging in powerchart.  No results found.    No family history on file.  Social History     Socioeconomic History     Marital status:    Tobacco Use    Smoking status: Never    Smokeless tobacco: Never   Substance and Sexual Activity    Alcohol use: Yes     Alcohol/week: 5.0 standard drinks of alcohol     Types: 5 Glasses of wine per week    Drug use: Never     Past Medical History:   Diagnosis Date    Other specified postprocedural states     H/O colonoscopy    Other specified soft tissue disorders 04/24/2015    Leg swelling    Personal history of other infectious and parasitic diseases     History of cold sores     Past Surgical History:   Procedure Laterality Date    HYSTERECTOMY  11/20/2014    Hysterectomy    KNEE ARTHROSCOPY W/ DEBRIDEMENT  11/20/2014    Arthroscopy Knee       Charting was completed using voice recognition technology and may include unintended errors.

## 2025-03-05 LAB
25(OH)D3+25(OH)D2 SERPL-MCNC: 43 NG/ML (ref 30–100)
ALBUMIN SERPL-MCNC: 4.8 G/DL (ref 3.6–5.1)
ALP SERPL-CCNC: 91 U/L (ref 37–153)
ALT SERPL-CCNC: 18 U/L (ref 6–29)
ANION GAP SERPL CALCULATED.4IONS-SCNC: 11 MMOL/L (CALC) (ref 7–17)
AST SERPL-CCNC: 17 U/L (ref 10–35)
BILIRUB SERPL-MCNC: 0.9 MG/DL (ref 0.2–1.2)
BUN SERPL-MCNC: 15 MG/DL (ref 7–25)
CALCIUM SERPL-MCNC: 9.7 MG/DL (ref 8.6–10.4)
CHLORIDE SERPL-SCNC: 107 MMOL/L (ref 98–110)
CHOLEST SERPL-MCNC: 177 MG/DL
CHOLEST/HDLC SERPL: 2.6 (CALC)
CO2 SERPL-SCNC: 23 MMOL/L (ref 20–32)
CREAT SERPL-MCNC: 0.69 MG/DL (ref 0.6–1)
EGFRCR SERPLBLD CKD-EPI 2021: 89 ML/MIN/1.73M2
ERYTHROCYTE [DISTWIDTH] IN BLOOD BY AUTOMATED COUNT: 12.2 % (ref 11–15)
EST. AVERAGE GLUCOSE BLD GHB EST-MCNC: 108 MG/DL
EST. AVERAGE GLUCOSE BLD GHB EST-SCNC: 6 MMOL/L
GLUCOSE SERPL-MCNC: 103 MG/DL (ref 65–99)
HBA1C MFR BLD: 5.4 % OF TOTAL HGB
HCT VFR BLD AUTO: 47 % (ref 35–45)
HDLC SERPL-MCNC: 68 MG/DL
HGB BLD-MCNC: 15.5 G/DL (ref 11.7–15.5)
LDLC SERPL CALC-MCNC: 91 MG/DL (CALC)
MCH RBC QN AUTO: 32.3 PG (ref 27–33)
MCHC RBC AUTO-ENTMCNC: 33 G/DL (ref 32–36)
MCV RBC AUTO: 97.9 FL (ref 80–100)
NONHDLC SERPL-MCNC: 109 MG/DL (CALC)
PLATELET # BLD AUTO: 213 THOUSAND/UL (ref 140–400)
PMV BLD REES-ECKER: 13.2 FL (ref 7.5–12.5)
POTASSIUM SERPL-SCNC: 4.2 MMOL/L (ref 3.5–5.3)
PROT SERPL-MCNC: 7.3 G/DL (ref 6.1–8.1)
RBC # BLD AUTO: 4.8 MILLION/UL (ref 3.8–5.1)
SODIUM SERPL-SCNC: 141 MMOL/L (ref 135–146)
TRIGL SERPL-MCNC: 85 MG/DL
TSH SERPL-ACNC: 2 MIU/L (ref 0.4–4.5)
WBC # BLD AUTO: 11.6 THOUSAND/UL (ref 3.8–10.8)

## 2025-03-07 DIAGNOSIS — D72.829 LEUKOCYTOSIS, UNSPECIFIED TYPE: Primary | ICD-10-CM

## 2025-03-10 ENCOUNTER — TELEPHONE (OUTPATIENT)
Dept: PRIMARY CARE | Facility: CLINIC | Age: 80
End: 2025-03-10
Payer: MEDICARE

## 2025-03-17 LAB
NON-UH HIE BASO COUNT: 0.11 X1000 (ref 0–0.2)
NON-UH HIE BASOS %: 1.2 %
NON-UH HIE DIFF?: ABNORMAL
NON-UH HIE EOS COUNT: 0.16 X1000 (ref 0–0.5)
NON-UH HIE EOSIN %: 1.6 %
NON-UH HIE HCT: 43.3 % (ref 36–46)
NON-UH HIE HGB: 14.7 G/DL (ref 12–16)
NON-UH HIE INSTR WBC: 9.7
NON-UH HIE LYMPH %: 35.4 %
NON-UH HIE LYMPH COUNT: 3.43 X1000 (ref 1.2–4.8)
NON-UH HIE MCH: 32.1 PG (ref 27–34)
NON-UH HIE MCHC: 33.9 G/DL (ref 32–37)
NON-UH HIE MCV: 94.8 FL (ref 80–100)
NON-UH HIE MONO %: 8.1 %
NON-UH HIE MONO COUNT: 0.79 X1000 (ref 0.1–1)
NON-UH HIE MPV: 10.8 FL (ref 7.4–10.4)
NON-UH HIE NEUTROPHIL %: 53.7 %
NON-UH HIE NEUTROPHIL COUNT (ANC): 5.21 X1000 (ref 1.4–8.8)
NON-UH HIE NUCLEATED RBC: 0 /100WBC
NON-UH HIE PLATELET: 200 X10 (ref 150–450)
NON-UH HIE RBC: 4.57 X10 (ref 4.2–5.4)
NON-UH HIE RDW: 13.6 % (ref 11.5–14.5)
NON-UH HIE WBC: 9.7 X10 (ref 4.5–11)

## 2025-07-29 ENCOUNTER — APPOINTMENT (OUTPATIENT)
Dept: PRIMARY CARE | Facility: CLINIC | Age: 80
End: 2025-07-29
Payer: MEDICARE

## 2025-07-29 VITALS
BODY MASS INDEX: 27.46 KG/M2 | DIASTOLIC BLOOD PRESSURE: 68 MMHG | WEIGHT: 155 LBS | HEIGHT: 63 IN | OXYGEN SATURATION: 97 % | SYSTOLIC BLOOD PRESSURE: 126 MMHG | HEART RATE: 73 BPM

## 2025-07-29 DIAGNOSIS — R25.3 FASCICULATION: Primary | ICD-10-CM

## 2025-07-29 PROCEDURE — 3078F DIAST BP <80 MM HG: CPT | Performed by: INTERNAL MEDICINE

## 2025-07-29 PROCEDURE — 1159F MED LIST DOCD IN RCRD: CPT | Performed by: INTERNAL MEDICINE

## 2025-07-29 PROCEDURE — 99212 OFFICE O/P EST SF 10 MIN: CPT | Performed by: INTERNAL MEDICINE

## 2025-07-29 PROCEDURE — 3074F SYST BP LT 130 MM HG: CPT | Performed by: INTERNAL MEDICINE

## 2025-07-29 PROCEDURE — 1036F TOBACCO NON-USER: CPT | Performed by: INTERNAL MEDICINE

## 2025-07-29 NOTE — PROGRESS NOTES
"Subjective   Patient ID: Janie Aranda is a 80 y.o. female who presents for the following     PHYSICAL and MEDICARE WELLNESS     Assessment/Plan   Left upper extremity in the decubital fossa saw some fasciculations that are gone. Normal anatomy noted.   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      mammograms in 2025     follow up in 6 months            patient volunteers in a       HPI  female, htn, HLD, PUD, H. 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 Currently not present. She points to the cubital fossa     Other medical       HTN: patient denies any headaches, blurred vision or dizziness. patient denies any stroke like symptoms     HLD: Patient denies any muscle aches and is tolerating statin therapy  started on atorvastatin LDL improved from 150s to 70s. no side effects at this time.   she does a good job with limiting simple carbohydrates and continue to do lifestyle modifications including dieting and exercising     last colonoscopy was 2017 with repeat next year with dr denise     family history: mom leukemia, melena and diabetes and mi and  at 73      patient enjoys exercising a lot walks 5 miles, swimming    Visit Vitals  /68 (BP Location: Left arm, Patient Position: Sitting, BP Cuff Size: Adult)   Pulse 73   Ht 1.6 m (5' 3\")   Wt 70.3 kg (155 lb)   SpO2 97%   BMI " 27.46 kg/m²   Smoking Status Never   BSA 1.77 m²     PHYSICAL EXAM   General appearance: Alert and in no acute distress. speech is clear and coherent  HEENT: Sclera and conjunctiva white, EOMI, uvela midline, no mouth lesions.     Respiratory : No respiratory distress, normal respiratory rhythm and effort. Clear bilateral breath sounds. No wheezing or rhonchi.   Cardiovascular: heart rate regular, S1, S2. no murmurs. no Lower extremity edema  Skin inspection: Normal skin color and pigmentation, normal skin turgor and no visible rash, induration, or cellulitis  MSK: 5/5 strength upper and lower extremities without gait abnormalities. no loss of muscle mass   Neuro: 2-12 CN grossly intact.  no slurred speech. no lateralizing deficit  Psychiatric Orientation: Oriented to person, place, and time. no depression, homicidal or suicidal thoughts, normal affect       REVIEW OF SYSTEMS   Constitutional: not feeling tired and no fever, chills or sweats. Denies weight loss    HEENT: no earache and no sore throat. no blurred vision and or double vision. no headache  Cardiovascular: no exertional chest pain, no palpitations,    Lungs: Denies shortness of breath, exertional dyspnea, wheezing  Gastrointestinal: no change in bowel habits, no diarrhea, no nausea,    Musculoskeletal: no myalgias, no muscle weakness and no limb swelling.   Skin: no rashes, no change in skin color and pigmentation and no skin lumps.   Neurological: no headaches, no seizures, no numbness, no lateralizing deficits and no fainting.   Psychiatric: no depression and no anxiety.   Urine: denies polyuria, hematuria, dysuria        No Known Allergies    Current Outpatient Medications   Medication Sig Dispense Refill    amLODIPine (Norvasc) 5 mg tablet Take 1 tablet (5 mg) by mouth once daily. 90 tablet 3    atorvastatin (Lipitor) 10 mg tablet Take 1 tablet (10 mg) by mouth once daily. 90 tablet 3    cholecalciferol (Vitamin D-3) 50 mcg (2,000 unit) capsule Take  1 capsule (2,000 Units) by mouth early in the morning..      multivitamin with minerals (Adults Multivitamin) tablet Take 1 tablet by mouth once daily.       No current facility-administered medications for this visit.       Objective     No visits with results within 4 Month(s) from this visit.   Latest known visit with results is:   Orders Only on 03/17/2025   Component Date Value Ref Range Status    [facility] HCT 03/17/2025 43.3  36.0 - 46.0 % Final    [facility] MPV 03/17/2025 10.8 (H)  7.4 - 10.4 fL Final    [facility] RBC 03/17/2025 4.57  4.20 - 5.40 x10 Final    [facility] Platelet 03/17/2025 200  150 - 450 x10 Final    [facility] MCHC 03/17/2025 33.9  32.0 - 37.0 g/dL Final    [facility] Instr WBC 03/17/2025 9.7   Final    [facility] Nucleated RBC 03/17/2025 0  /100WBC Final    [facility] MCV 03/17/2025 94.8  80.0 - 100.0 fL Final    [facility] HGB 03/17/2025 14.7  12.0 - 16.0 g/dL Final    [facility] RDW 03/17/2025 13.6  11.5 - 14.5 % Final    [facility] WBC 03/17/2025 9.7  4.5 - 11.0 x10 Final    [facility] MCH 03/17/2025 32.1  27.0 - 34.0 pg Final    [facility] DIFF? 03/17/2025 No^NO   Final    [facility] Baso Count 03/17/2025 0.11  0.00 - 0.20 x1000 Final    [facility] Neutrophil % 03/17/2025 53.7  % Final    [facility] Basos % 03/17/2025 1.2  % Final    [facility] Mono % 03/17/2025 8.1  % Final    [facility] Eos Count 03/17/2025 0.16  0.00 - 0.50 x1000 Final    [facility] Lymph Count 03/17/2025 3.43  1.20 - 4.80 x1000 Final    [facility] Neutrophil Count (ANC) 03/17/2025 5.21  1.40 - 8.80 x1000 Final    [facility] Eosin % 03/17/2025 1.6  % Final    [facility] Mono Count 03/17/2025 0.79  0.10 - 1.00 x1000 Final    [facility] Lymph % 03/17/2025 35.4  % Final       Radiology: Reviewed imaging in powerchart.  No results found.    Family History   Problem Relation Name Age of Onset    Cancer Mother Molly Chou 60 - 69    Diabetes Mother Molly Chou 60 - 69    Asthma Mother Molly Chou     Miscarriages /  Stillbirths Mother Molly Chou     Asthma Son Jr Aranda     Colon cancer Maternal Grandfather Abdullahi Reynoso 50 - 59    Heart disease Father Abdullahi Chou      Social History     Socioeconomic History    Marital status:    Tobacco Use    Smoking status: Never    Smokeless tobacco: Never   Substance and Sexual Activity    Alcohol use: Yes     Alcohol/week: 6.0 standard drinks of alcohol     Types: 5 Glasses of wine, 1 Standard drinks or equivalent per week    Drug use: Never    Sexual activity: Yes     Partners: Male     Birth control/protection: Surgical     Comment: Had a hysterectomy     Past Medical History:   Diagnosis Date    Cataract 9/21/22 & 4/17/24    Glaucoma 3/02/21    HL (hearing loss) 11/20/20    Hypertension 4/19/24    Other specified postprocedural states     H/O colonoscopy    Other specified soft tissue disorders 04/24/2015    Leg swelling    Peptic ulceration 1997    Personal history of other infectious and parasitic diseases     History of cold sores    Varicella 1950's    Visual impairment 1960's     Past Surgical History:   Procedure Laterality Date    EYE SURGERY  4/17/24 & 9/21/22    HYSTERECTOMY  11/20/2014    Hysterectomy    JOINT REPLACEMENT  3/23/21    KNEE ARTHROSCOPY W/ DEBRIDEMENT  11/20/2014    Arthroscopy Knee    SINUS SURGERY  1/16/17       Charting was completed using voice recognition technology and may include unintended errors.

## 2026-03-10 ENCOUNTER — APPOINTMENT (OUTPATIENT)
Dept: PRIMARY CARE | Facility: CLINIC | Age: 81
End: 2026-03-10
Payer: MEDICARE